# Patient Record
Sex: MALE | Race: WHITE
[De-identification: names, ages, dates, MRNs, and addresses within clinical notes are randomized per-mention and may not be internally consistent; named-entity substitution may affect disease eponyms.]

---

## 2017-03-10 ENCOUNTER — HOSPITAL ENCOUNTER (OUTPATIENT)
Age: 32
Discharge: HOME | End: 2017-03-10
Payer: COMMERCIAL

## 2017-03-10 DIAGNOSIS — G93.0: ICD-10-CM

## 2017-03-10 DIAGNOSIS — R56.9: Primary | ICD-10-CM

## 2017-03-10 PROCEDURE — 70553 MRI BRAIN STEM W/O & W/DYE: CPT

## 2017-03-24 ENCOUNTER — HOSPITAL ENCOUNTER (OUTPATIENT)
Age: 32
End: 2017-03-24
Payer: COMMERCIAL

## 2017-03-24 DIAGNOSIS — R56.9: Primary | ICD-10-CM

## 2018-04-06 ENCOUNTER — HOSPITAL ENCOUNTER (OUTPATIENT)
Dept: HOSPITAL 76 - LAB.WCP | Age: 33
Discharge: HOME | End: 2018-04-06
Attending: FAMILY MEDICINE
Payer: COMMERCIAL

## 2018-04-06 DIAGNOSIS — Z20.9: Primary | ICD-10-CM

## 2018-04-06 PROCEDURE — 81599 UNLISTED MAAA: CPT

## 2018-04-06 PROCEDURE — 87662 ZIKA VIRUS DNA/RNA AMP PROBE: CPT

## 2018-04-06 PROCEDURE — 36415 COLL VENOUS BLD VENIPUNCTURE: CPT

## 2019-03-31 ENCOUNTER — HOSPITAL ENCOUNTER (EMERGENCY)
Dept: HOSPITAL 76 - ED | Age: 34
Discharge: HOME | End: 2019-03-31
Payer: COMMERCIAL

## 2019-03-31 VITALS — DIASTOLIC BLOOD PRESSURE: 87 MMHG | SYSTOLIC BLOOD PRESSURE: 150 MMHG
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
## 2019-03-31 DIAGNOSIS — S10.93XA: ICD-10-CM

## 2019-03-31 DIAGNOSIS — S00.531A: ICD-10-CM

## 2019-03-31 DIAGNOSIS — G40.909: Primary | ICD-10-CM

## 2019-03-31 PROCEDURE — 99283 EMERGENCY DEPT VISIT LOW MDM: CPT

## 2019-03-31 NOTE — ED PHYSICIAN DOCUMENTATION
PD HPI SEIZURE





- Stated complaint


Stated Complaint: SEIZURE/LIP INJ





- Chief complaint


Chief Complaint: Neuro





- History obtained from


History obtained from: Patient, Family





- History of Present Illness


Timing - onset: How many hours ago (1), Today


Witnessed: Witnessed (friend came in to see him just finishing a seizure, then 

he was less responsive for few minutes, and slowly more coherent. Walking around

without purpose after the seizure, and became more cognizant. Seems at normal 

function on arrival here. He has petechial rash left anterior neck and struck 

upper left lip.)


Number of seizures: Single, Lasted - seconds


Description of seizure activity: Generalized


Injury during seizure: Other (left upper lip outer area with local bruising and 

tenderness. No dental injury. No inner lip nor tongue abrasions. Also with 

petechial rash and linear frances left anterior lower neck. Normal voice, swallowi

ng and breathing.)


History of seizures: Known seizure disorder


Contributing factors: Off meds (has Rx but had not had seiaure for long time so 

opts for not taking meds.)


Similar symptoms before: Diagnosis (has had infrequent seizures without apparent

triggers. Today was feeling okay and then had seizure. Last seizure almost 3 

years ago, and has only had 5-6 total since onset of seizures in 2010.)





Review of Systems


Constitutional: denies: Fever


Nose: denies: Rhinorrhea / runny nose, Congestion


Throat: denies: Sore throat


Cardiac: denies: Chest pain / pressure


Respiratory: denies: Dyspnea, Cough


GI: denies: Abdominal Pain, Nausea, Vomiting, Diarrhea





PD PAST MEDICAL HISTORY





- Past Medical History


Neuro: Seizure disorder





- Past Surgical History


Past Surgical History: No





- Allergies


Allergies/Adverse Reactions: 


                                    Allergies











Allergy/AdvReac Type Severity Reaction Status Date / Time


 


No Known Drug Allergies Allergy   Verified 03/31/19 13:00














- Social History


Does the pt smoke?: No


Smoking Status: Never smoker


Does the pt drink ETOH?: Yes


Does the pt have substance abuse?: Yes





PD ED PE NORMAL





- Vitals


Vital signs reviewed: Yes





- General


General: Alert and oriented X 3, No acute distress, Well developed/nourished





- HEENT


HEENT: Pharynx benign





- Neck


Neck: Supple, no meningeal sign, No bony TTP, No adenopathy, Other (left 

anterior neck with some petechial changes and has a diagonal linear abrasion 

area, with minimal tenderness and normal voice/breathing. Left upper lip with 

small contusion. Inner lip and teeth are normal. )





Results





- Vitals


Vitals: 


                               Vital Signs - 24 hr











  03/31/19





  12:56


 


Temperature 36.5 C


 


Heart Rate 104 H


 


Respiratory 14





Rate 


 


Blood Pressure 150/87 H


 


O2 Saturation 97








                                     Oxygen











O2 Source                      Room air

















PD MEDICAL DECISION MAKING





- ED course


Complexity details: considered differential (appears well post seizure. He opts 

for no medications. ), d/w patient, d/w family





Departure





- Departure


Disposition: 01 Home, Self Care


Clinical Impression: 


 Recurrent seizures





Contusion of neck


Qualifiers:


 Encounter type: initial encounter Qualified Code(s): S10.93XA - Contusion of 

unspecified part of neck, initial encounter





Condition: Stable


Record reviewed to determine appropriate education?: Yes


Instructions:  ED Seizure Recurrent


Follow-Up: 


Maxi Pete MD [Primary Care Provider] - 


Comments: 


Stay well-hydrated.  Tylenol or ibuprofen if needed for pains.  Given the 

infrequency of your seizures, I would not fault nor support your choice on 

taking antiseizure medicines, but think it is a choice for you to make.  Follow-

up with your primary care


Discharge Date/Time: 03/31/19 13:40

## 2020-03-30 ENCOUNTER — HOSPITAL ENCOUNTER (OUTPATIENT)
Dept: HOSPITAL 76 - EMS | Age: 35
End: 2020-03-30
Attending: SURGERY
Payer: COMMERCIAL

## 2020-03-30 ENCOUNTER — HOSPITAL ENCOUNTER (EMERGENCY)
Dept: HOSPITAL 76 - ED | Age: 35
Discharge: HOME | End: 2020-03-30
Payer: COMMERCIAL

## 2020-03-30 VITALS — SYSTOLIC BLOOD PRESSURE: 144 MMHG | DIASTOLIC BLOOD PRESSURE: 88 MMHG
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
## 2020-03-30 DIAGNOSIS — S91.001A: ICD-10-CM

## 2020-03-30 DIAGNOSIS — S61.213A: ICD-10-CM

## 2020-03-30 DIAGNOSIS — S50.812A: ICD-10-CM
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
## 2020-03-30 DIAGNOSIS — S30.1XXA: ICD-10-CM
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
## 2020-03-30 DIAGNOSIS — S32.020A: Primary | ICD-10-CM
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
## 2020-03-30 DIAGNOSIS — S30.811A: ICD-10-CM

## 2020-03-30 DIAGNOSIS — M54.5: Primary | ICD-10-CM

## 2020-03-30 DIAGNOSIS — S50.811A: ICD-10-CM
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
## 2020-03-30 PROCEDURE — 74177 CT ABD & PELVIS W/CONTRAST: CPT

## 2020-03-30 PROCEDURE — 99284 EMERGENCY DEPT VISIT MOD MDM: CPT

## 2020-03-30 PROCEDURE — 73610 X-RAY EXAM OF ANKLE: CPT
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
